# Patient Record
Sex: FEMALE | URBAN - METROPOLITAN AREA
[De-identification: names, ages, dates, MRNs, and addresses within clinical notes are randomized per-mention and may not be internally consistent; named-entity substitution may affect disease eponyms.]

---

## 2021-01-19 ENCOUNTER — APPOINTMENT (RX ONLY)
Dept: URBAN - METROPOLITAN AREA CLINIC 55 | Facility: CLINIC | Age: 66
Setting detail: DERMATOLOGY
End: 2021-01-19

## 2021-01-19 DIAGNOSIS — Z41.9 ENCOUNTER FOR PROCEDURE FOR PURPOSES OTHER THAN REMEDYING HEALTH STATE, UNSPECIFIED: ICD-10-CM

## 2021-01-19 PROCEDURE — ? PLATELET RICH PLASMA INJECTION

## 2021-01-19 PROCEDURE — ? MICRONEEDLING

## 2021-01-19 PROCEDURE — ? FILLERS

## 2021-01-19 NOTE — PROCEDURE: PLATELET RICH PLASMA INJECTION
Amount Injected At This Location In Cc (Will Not Render If 0): 0
Which Technique?: Default
Indication Override: topical
Show Additional Techniques: No
Detail Level: Simple
Consent: Written consent obtained, risks reviewed including but not limited to pain, scarring, infection and incomplete improvement.  Patient understands the procedure is cosmetic in nature and will require out of pocket payment.
Post-Care Instructions: After the procedure, take precautions agains sun exposure. Do not apply make-up for 12 hours after the procedure. Avoid alcohol based toners for 10-14 days. After 2-3 days patients can return to their regular skin regimen. [facility]inity post advance microneedling instructions given to patient
Price (Use Numbers Only, No Special Characters Or $): 300.00

## 2021-01-19 NOTE — PROCEDURE: MICRONEEDLING
Detail Level: Zone
Depth In Mm (Location #3): 0.1
Price (Use Numbers Only, No Special Characters Or $): 497
Post-Care Instructions: After the procedure, take precautions agains sun exposure. Do not apply sunscreen for 12 hours after the procedure. Do not apply make-up for 12 hours after the procedure. Avoid alcohol based toners for 10-14 days. After 2-3 days patients can return to their regular skin regimen.
Consent: Written consent obtained, risks reviewed including but not limited to pain, scarring, infection and incomplete improvement.  Patient understands the procedure is cosmetic in nature and will require out of pocket payment.
Treatment Number (Optional): 0

## 2021-01-19 NOTE — PROCEDURE: FILLERS
Additional Area 4 Volume In Cc: 0
Post-Care Instructions: Patient instructed to apply ice to reduce swelling. Post care handout given to patient.
Include Cannula Information In Note?: Yes
Price (Use Numbers Only, No Special Characters Or $): 5956.00
Include Cannula Information In Note?: No
Cheeks Filler Volume In Cc: 1
Filler: Restylane Kysse
Anesthesia Type: 1% lidocaine with epinephrine
Map Statment: See Attach Map for Complete Details
Include Cannula Size?: 27G
Lot #: [id number]
Additional Anesthesia Volume In Cc: 6
Expiration Date (Month Year): 7/31/23
Filler: Clarissa Almeida
Anesthesia Volume In Cc: 0.3
Detail Level: Detailed
Expiration Date (Month Year): 6/30/22
Lot #: [id number]
Consent: Written consent obtained. Risks include but not limited to bruising, beading, irregular texture, ulceration, infection, allergic reaction, scar formation, incomplete augmentation, temporary nature, procedural pain.

## 2021-09-23 ENCOUNTER — APPOINTMENT (RX ONLY)
Dept: URBAN - METROPOLITAN AREA CLINIC 55 | Facility: CLINIC | Age: 66
Setting detail: DERMATOLOGY
End: 2021-09-23

## 2021-09-23 ENCOUNTER — RX ONLY (OUTPATIENT)
Age: 66
Setting detail: RX ONLY
End: 2021-09-23

## 2021-09-23 DIAGNOSIS — Z41.9 ENCOUNTER FOR PROCEDURE FOR PURPOSES OTHER THAN REMEDYING HEALTH STATE, UNSPECIFIED: ICD-10-CM

## 2021-09-23 PROCEDURE — ? PRESCRIPTION

## 2021-09-23 PROCEDURE — ? COSMETIC CONSULTATION: GENERAL

## 2021-09-23 RX ORDER — ALPRAZOLAM 1 MG/1
TABLET ORAL
Qty: 1 | Refills: 0 | Status: ERX | COMMUNITY
Start: 2021-09-23

## 2021-09-23 RX ORDER — HYDROCODONE BITARTRATE AND ACETAMINOPHEN 5; 325 MG/1; MG/1
TABLET ORAL
Qty: 2 | Refills: 0 | Status: ERX | COMMUNITY
Start: 2021-09-23

## 2021-10-01 ENCOUNTER — APPOINTMENT (RX ONLY)
Dept: URBAN - METROPOLITAN AREA CLINIC 55 | Facility: CLINIC | Age: 66
Setting detail: DERMATOLOGY
End: 2021-10-01

## 2021-10-01 DIAGNOSIS — Z41.9 ENCOUNTER FOR PROCEDURE FOR PURPOSES OTHER THAN REMEDYING HEALTH STATE, UNSPECIFIED: ICD-10-CM

## 2021-10-01 PROCEDURE — ? ICON IPL

## 2021-10-01 PROCEDURE — ? SCULPTRA

## 2021-10-01 ASSESSMENT — LOCATION SIMPLE DESCRIPTION DERM
LOCATION SIMPLE: RIGHT CHEEK
LOCATION SIMPLE: LEFT CHEEK
LOCATION SIMPLE: LEFT LIP
LOCATION SIMPLE: NOSE

## 2021-10-01 ASSESSMENT — LOCATION DETAILED DESCRIPTION DERM
LOCATION DETAILED: RIGHT CENTRAL MALAR CHEEK
LOCATION DETAILED: LEFT CENTRAL MALAR CHEEK
LOCATION DETAILED: LEFT LOWER CUTANEOUS LIP
LOCATION DETAILED: NASAL DORSUM

## 2021-10-01 ASSESSMENT — LOCATION ZONE DERM
LOCATION ZONE: NOSE
LOCATION ZONE: FACE
LOCATION ZONE: LIP

## 2021-10-01 NOTE — PROCEDURE: SCULPTRA
Anesthesia Volume In Cc: 0.5
Show Temples Volume?: Yes
Forehead Sculptra Filler Volume In Cc: 0
Consent: Verbal consent obtained. Risks include but not limited to bruising, beading, irregular texture, ulceration, infection, allergic reaction, scar formation, incomplete augmentation, temporary nature, procedural pain.
Lot #: [id number]
Expiration Date (Month Year): 1/31/2024
Show Right And Left [facility] Volume?: No
Vials Reconstituted (Required For Inventory): 2
Map Statement: See Attached Map for Complete Details.
Detail Level: Simple
Dilution Method: The Sculptra was reconstituted with 8 mL of sterile water and 2 mL of 2% plain lidocaine for a total volume of 10 mL for each vial.
Injection Technique: The Sculptra was injected to the listed areas after cleansing the skin and providing appropriate anesthesia. 25 g cannula was used to deposit product.
Anesthesia Type: 1% lidocaine with epinephrine
Post-Care Instructions: Patient instructed to apply ice to reduce swelling.

## 2021-10-01 NOTE — PROCEDURE: ICON IPL
Length Of Topical Anesthesia Application (Optional): 60 minutes
Contact: Light
Energy (Optional- Include Units): 44 J/cm2 / 20 ms
Consent: Verbal consent obtained, risks reviewed including but not limited to crusting, scabbing, blistering, scarring, darker or lighter pigmentary change, bruising, and/or incomplete response.
Post-Care Instructions: I reviewed with the patient in detail post-care instructions. Patient should stay away from the sun and wear sun protection until treated areas are fully healed.
Detail Level: Zone
Energy (Optional- Include Units): 100 J/cm2 / 60 ms
Topical Anesthesia?: 20% benzocaine, 8% lidocaine, 4% tetracaine
Treatment Number (Optional): 1
Anesthesia Volume In Cc: 0
External Cooling Fan Speed: 5
Pre-Procedure Text: After consent was obtained, the treatment areas were cleansed and treated using the parameters mentioned above.
Anesthesia Type: 1% lidocaine with epinephrine

## 2022-01-27 ENCOUNTER — APPOINTMENT (RX ONLY)
Dept: URBAN - METROPOLITAN AREA CLINIC 55 | Facility: CLINIC | Age: 67
Setting detail: DERMATOLOGY
End: 2022-01-27

## 2022-01-27 DIAGNOSIS — Z41.9 ENCOUNTER FOR PROCEDURE FOR PURPOSES OTHER THAN REMEDYING HEALTH STATE, UNSPECIFIED: ICD-10-CM

## 2022-01-27 PROCEDURE — ? ICON IPL

## 2022-01-27 ASSESSMENT — LOCATION DETAILED DESCRIPTION DERM
LOCATION DETAILED: RIGHT CENTRAL MALAR CHEEK
LOCATION DETAILED: LEFT CENTRAL MALAR CHEEK

## 2022-01-27 ASSESSMENT — LOCATION ZONE DERM: LOCATION ZONE: FACE

## 2022-01-27 ASSESSMENT — LOCATION SIMPLE DESCRIPTION DERM
LOCATION SIMPLE: RIGHT CHEEK
LOCATION SIMPLE: LEFT CHEEK

## 2022-01-27 NOTE — PROCEDURE: ICON IPL
External Cooling Fan Speed: 5
Energy (Optional- Include Units): 100 J/cm2 / 60 ms
Energy (Optional- Include Units): 40 J/cm2 / 15 ms
Contact: Light
Anesthesia Volume In Cc: 0
Length Of Topical Anesthesia Application (Optional): 60 minutes
Consent: Verbal consent obtained, risks reviewed including but not limited to crusting, scabbing, blistering, scarring, darker or lighter pigmentary change, bruising, and/or incomplete response.
Post-Care Instructions: I reviewed with the patient in detail post-care instructions. Patient should stay away from the sun and wear sun protection until treated areas are fully healed.
Passes: 1
Pre-Procedure Text: After consent was obtained, the treatment areas were cleansed and treated using the parameters mentioned above.
Detail Level: Zone
Treatment Number (Optional): 2
Anesthesia Type: 1% lidocaine with epinephrine
Topical Anesthesia?: 20% benzocaine, 8% lidocaine, 4% tetracaine

## 2022-02-02 ENCOUNTER — APPOINTMENT (RX ONLY)
Dept: URBAN - METROPOLITAN AREA CLINIC 55 | Facility: CLINIC | Age: 67
Setting detail: DERMATOLOGY
End: 2022-02-02

## 2022-02-02 DIAGNOSIS — Z41.9 ENCOUNTER FOR PROCEDURE FOR PURPOSES OTHER THAN REMEDYING HEALTH STATE, UNSPECIFIED: ICD-10-CM

## 2022-02-02 PROCEDURE — ? DIAGNOSIS COMMENT

## 2022-02-02 PROCEDURE — ? COSMETIC FOLLOW-UP

## 2022-02-02 NOTE — PROCEDURE: COSMETIC FOLLOW-UP
Detail Level: Zone
Comments (Free Text): Patient had IPL full face on 1/27/22. She scheduled a phone follow up to determine if continuing with IPL treatments was optimal or if switching to Fraxel treatments would be a more effective option. At this time, patient reports some residual minor swelling to suborbital area and mild erythema throughout face. She has not been icing the area or sleeping elevated. She was advised to ice the area intermittently, sleep slightly evaluated and take an antihistamine BID to reduce swelling; verbalized understanding. Patient will start HQ 4% cream today to insure she will be ready for her Fraxel treatment if deemed the appropriate next step. The patient will call next week once the post IPL effects have dissipated to discuss POC.

## 2022-02-07 ENCOUNTER — RX ONLY (OUTPATIENT)
Age: 67
Setting detail: RX ONLY
End: 2022-02-07

## 2022-02-07 RX ORDER — HYDROQUINONE 40 MG/G
CREAM TOPICAL
Qty: 57 | Refills: 2 | COMMUNITY
Start: 2022-02-07

## 2022-09-14 ENCOUNTER — RX ONLY (OUTPATIENT)
Age: 67
Setting detail: RX ONLY
End: 2022-09-14

## 2022-09-14 ENCOUNTER — APPOINTMENT (RX ONLY)
Dept: URBAN - METROPOLITAN AREA CLINIC 55 | Facility: CLINIC | Age: 67
Setting detail: DERMATOLOGY
End: 2022-09-14

## 2022-09-14 DIAGNOSIS — Z41.9 ENCOUNTER FOR PROCEDURE FOR PURPOSES OTHER THAN REMEDYING HEALTH STATE, UNSPECIFIED: ICD-10-CM

## 2022-09-14 PROCEDURE — ? ICON IPL

## 2022-09-14 PROCEDURE — ? INVENTORY

## 2022-09-14 ASSESSMENT — LOCATION DETAILED DESCRIPTION DERM
LOCATION DETAILED: RIGHT LOWER CUTANEOUS LIP
LOCATION DETAILED: RIGHT CENTRAL MALAR CHEEK
LOCATION DETAILED: LEFT CENTRAL MALAR CHEEK

## 2022-09-14 ASSESSMENT — LOCATION SIMPLE DESCRIPTION DERM
LOCATION SIMPLE: RIGHT LIP
LOCATION SIMPLE: RIGHT CHEEK
LOCATION SIMPLE: LEFT CHEEK

## 2022-09-14 ASSESSMENT — LOCATION ZONE DERM
LOCATION ZONE: FACE
LOCATION ZONE: LIP

## 2022-09-14 NOTE — PROCEDURE: ICON IPL
Contact: Light
Pre-Procedure Text: After consent was obtained, the treatment areas were cleansed and treated using the parameters mentioned above.
Pulse Duration (Optional- Include Units): 15 ms
Detail Level: Zone
Consent obtained, risks reviewed.
Anesthesia Volume In Cc: 0
Energy (Optional- Include Units): 40 J/cm2
Treatment Number (Optional): 3
Energy (Optional- Include Units): 30 J/cm2
Pulse Duration (Optional- Include Units): 10 ms
Post-Care Instructions: I reviewed with the patient in detail post-care instructions. Patient should avoid sun exposure and to wear sun protection until treated areas are fully healed.
Passes: 1
Energy (Optional- Include Units): 36 J/cm2
External Cooling Fan Speed: 5

## 2022-10-27 ENCOUNTER — APPOINTMENT (RX ONLY)
Dept: URBAN - METROPOLITAN AREA CLINIC 55 | Facility: CLINIC | Age: 67
Setting detail: DERMATOLOGY
End: 2022-10-27

## 2022-10-27 DIAGNOSIS — Z41.9 ENCOUNTER FOR PROCEDURE FOR PURPOSES OTHER THAN REMEDYING HEALTH STATE, UNSPECIFIED: ICD-10-CM

## 2022-10-27 PROCEDURE — ? IN-HOUSE DISPENSING PHARMACY

## 2022-10-27 PROCEDURE — ? INVENTORY

## 2022-10-27 NOTE — PROCEDURE: IN-HOUSE DISPENSING PHARMACY
Product 26 Amount/Unit (Numbers Only): 0
Name Of Product 1: Anti-Aging Brightening Cream
Product 6 Unit Type: ml
Product 14 Unit Type: mg
Name Of Product 4: Hydroquinone 8% Emulsion
Product 5 Amount/Unit (Numbers Only): 30
Name Of Product 6: Rosacea Triple Combination Gel
Product 1 Application Directions: Apply nightly or as directed. Use SPF daily.
Detail Level: Zone
Name Of Product 5: Hydrating Tretinoin 0.025% Cream
Name Of Product 7: Lidocaine 23% / Tetracaine 7% Cream
Product 1 Refills: 2
Product 7 Application Directions: Apply only as directed
Product 2 Application Directions: Apply nightly or as directed.
Name Of Product 3: Acne Triple Combination Gel
Product 5 Refills: 11
Product 7 Units Dispensed: 1
Send Charges To Patient Encounter: No
Render Product Pricing In Note: Yes
Name Of Product 2: Dapsone / Spironolactone Gel

## 2022-11-28 ENCOUNTER — APPOINTMENT (RX ONLY)
Dept: URBAN - METROPOLITAN AREA CLINIC 55 | Facility: CLINIC | Age: 67
Setting detail: DERMATOLOGY
End: 2022-11-28

## 2022-11-28 DIAGNOSIS — Z41.9 ENCOUNTER FOR PROCEDURE FOR PURPOSES OTHER THAN REMEDYING HEALTH STATE, UNSPECIFIED: ICD-10-CM

## 2022-11-28 PROCEDURE — ? PRO-NOX

## 2022-11-28 PROCEDURE — ? SCULPTRA

## 2022-11-28 PROCEDURE — ? ICON IPL

## 2022-11-28 PROCEDURE — ? INVENTORY

## 2022-11-28 ASSESSMENT — LOCATION DETAILED DESCRIPTION DERM
LOCATION DETAILED: RIGHT CENTRAL MALAR CHEEK
LOCATION DETAILED: LEFT CENTRAL MALAR CHEEK

## 2022-11-28 ASSESSMENT — LOCATION ZONE DERM: LOCATION ZONE: FACE

## 2022-11-28 ASSESSMENT — LOCATION SIMPLE DESCRIPTION DERM
LOCATION SIMPLE: LEFT CHEEK
LOCATION SIMPLE: RIGHT CHEEK

## 2022-11-28 NOTE — PROCEDURE: SCULPTRA
Show Decollete Volume?: Yes
Additional Area 3 Volume In Cc: 0
Show Right And Left Cheek Volume?: No
Dilution Method: The Sculptra was reconstituted with 8cc of sterile water and 2cc 2% plain lidocaine for each vial for a total volume of 10 cc for each vial.
Anesthesia Volume In Cc: 0.5
Injection Technique: The Sculptra was injected to the areas shown with a 25g cannula after prepping the skin with alcohol and/or chlorhexidine and providing appropriate anesthesia.
Map Statement: See Attached Map for Complete Details.
Detail Level: Detailed
Consent obtained.
Additional Anesthesia Volume In Cc: 6
Show Inventory Tab: Show
Post-Care Instructions: Aftercare instructions were provided to the patient.
Vials Reconstituted (Required For Inventory): 1
Anesthesia Type: 1% lidocaine with epinephrine

## 2022-11-28 NOTE — PROCEDURE: ICON IPL
Pulse Duration (Optional- Include Units): 15 ms
Pre-Procedure Text: After consent was obtained, the treatment areas were cleansed and treated using the parameters mentioned above.
Contact: Light
Energy (Optional- Include Units): 30 J/cm2
Length Of Topical Anesthesia Application (Optional): 60 minutes
Pulse Duration (Optional- Include Units): 10 ms
Treatment Number (Optional): 4
External Cooling Fan Speed: 5
Consent obtained, risks reviewed.
Post-Care Instructions: I reviewed with the patient in detail post-care instructions. Patient should avoid sun exposure and to wear sun protection until treated areas are fully healed.
Energy (Optional- Include Units): 46 J/cm2
Passes: 1
Detail Level: Zone
Anesthesia Volume In Cc: 0
Energy (Optional- Include Units): 36 J/cm2
Topical Anesthesia?: 23% lidocaine, 7% tetracaine

## 2022-11-28 NOTE — PROCEDURE: PRO-NOX
Pre-Procedure Text: The patient was connected to the Pro-Nox machine and positioned appropriately for the anticipated procedure.  Following the procedure they were disconnected and monitored for any lasting side effects prior to leaving the office.
Post-Care Instructions: Post care reviewed. Ice pack provided.
Consent obtained, risks reviewed.
Total Time (Leave Blank If Not Wanted): 20 mins
Detail Level: Zone

## 2023-01-04 ENCOUNTER — APPOINTMENT (RX ONLY)
Dept: URBAN - METROPOLITAN AREA CLINIC 55 | Facility: CLINIC | Age: 68
Setting detail: DERMATOLOGY
End: 2023-01-04

## 2023-01-04 DIAGNOSIS — Z41.9 ENCOUNTER FOR PROCEDURE FOR PURPOSES OTHER THAN REMEDYING HEALTH STATE, UNSPECIFIED: ICD-10-CM

## 2023-01-04 PROCEDURE — ? COSMETIC FOLLOW-UP

## 2023-01-04 PROCEDURE — ? INVENTORY

## 2023-01-04 NOTE — PROCEDURE: COSMETIC FOLLOW-UP
Detail Level: Zone
Comments (Free Text): Post lower face IPL photos obtained today form treatment on 11/28/22. Before and after photos reviewed with patient. Patient is very pleased with her results at this time. Discussed POC- return in the spring for 1 vial of Sculptra and evaluate need for IPL.

## 2023-04-11 ENCOUNTER — APPOINTMENT (RX ONLY)
Dept: URBAN - METROPOLITAN AREA CLINIC 55 | Facility: CLINIC | Age: 68
Setting detail: DERMATOLOGY
End: 2023-04-11

## 2023-04-11 DIAGNOSIS — Z41.9 ENCOUNTER FOR PROCEDURE FOR PURPOSES OTHER THAN REMEDYING HEALTH STATE, UNSPECIFIED: ICD-10-CM

## 2023-04-11 PROCEDURE — ? IN-HOUSE DISPENSING PHARMACY

## 2023-04-11 PROCEDURE — ? INVENTORY

## 2023-04-11 NOTE — PROCEDURE: IN-HOUSE DISPENSING PHARMACY
Product 1 Units Dispensed: 0
Product 12 Unit Type: mg
Product 1 Application Directions: Apply nightly or as directed. Use SPF daily.
Product 7 Unit Type: ml
Product 6 Refills: 11
Product 3 Amount/Unit (Numbers Only): 30
Name Of Product 4: Hydroquinone 8% Emulsion
Product 4 Application Directions: Apply nightly or as directed.
Name Of Product 3: Acne Triple Combination Gel
Product 6 Units Dispensed: 1
Render Refills If Set To 0: Yes
Product 7 Application Directions: Apply only as directed
Detail Level: Zone
Product 1 Refills: 2
Name Of Product 6: Rosacea Triple Combination Gel
Name Of Product 2: Dapsone / Spironolactone Gel
Name Of Product 5: Hydrating Tretinoin 0.025% Cream
Name Of Product 7: Lidocaine 23% / Tetracaine 7% Cream
Name Of Product 1: Anti-Aging Brightening Cream
Send Charges To Patient Encounter: No

## 2023-05-01 ENCOUNTER — APPOINTMENT (RX ONLY)
Dept: URBAN - METROPOLITAN AREA CLINIC 55 | Facility: CLINIC | Age: 68
Setting detail: DERMATOLOGY
End: 2023-05-01

## 2023-05-01 DIAGNOSIS — Z41.9 ENCOUNTER FOR PROCEDURE FOR PURPOSES OTHER THAN REMEDYING HEALTH STATE, UNSPECIFIED: ICD-10-CM

## 2023-05-01 PROCEDURE — ? PRO-NOX

## 2023-05-01 PROCEDURE — ? SCULPTRA

## 2023-05-01 PROCEDURE — ? INVENTORY

## 2023-05-01 NOTE — PROCEDURE: PRO-NOX
Detail Level: Zone
Total Time (Leave Blank If Not Wanted): 9 mins
Consent obtained, risks reviewed.
Pre-Procedure Text: The patient was connected to the Pro-Nox machine and positioned appropriately for the anticipated procedure. Following the procedure they were disconnected and monitored for any lasting side effects prior to leaving the office.
Post-Care Instructions: Post care reviewed. Ice pack provided.

## 2023-05-01 NOTE — PROCEDURE: SCULPTRA
Jawline Sculptra Filler Volume In Cc: 0
Show Dorsal Hands Volume?: Yes
Consent obtained.
Show Right And Left Pa Volume?: No
Additional Anesthesia Volume In Cc: 6
Detail Level: Detailed
Map Statement: See Attached Map for Complete Details.
Post-Care Instructions: Aftercare instructions were provided to the patient.
Show Inventory Tab: Show
Anesthesia Type: 1% lidocaine with epinephrine
Dilution Method: The Sculptra was reconstituted with 8cc of sterile water and 2cc 2% plain lidocaine for each vial for a total volume of 10 cc for each vial.
Vials Reconstituted (Required For Inventory): 1
Anesthesia Volume In Cc: 0.5
Injection Technique: The Sculptra was injected to the areas shown with a 25g cannula after prepping the skin with alcohol and/or chlorhexidine and providing appropriate anesthesia.

## 2023-08-07 ENCOUNTER — APPOINTMENT (RX ONLY)
Dept: URBAN - METROPOLITAN AREA CLINIC 55 | Facility: CLINIC | Age: 68
Setting detail: DERMATOLOGY
End: 2023-08-07

## 2023-08-07 DIAGNOSIS — Z41.9 ENCOUNTER FOR PROCEDURE FOR PURPOSES OTHER THAN REMEDYING HEALTH STATE, UNSPECIFIED: ICD-10-CM

## 2023-08-07 PROCEDURE — ? COSMETIC CONSULTATION: GENERAL

## 2023-08-07 PROCEDURE — ? DIAGNOSIS COMMENT

## 2023-08-07 PROCEDURE — ? INVENTORY

## 2023-10-09 ENCOUNTER — APPOINTMENT (RX ONLY)
Dept: URBAN - METROPOLITAN AREA CLINIC 55 | Facility: CLINIC | Age: 68
Setting detail: DERMATOLOGY
End: 2023-10-09

## 2023-10-09 DIAGNOSIS — Z41.9 ENCOUNTER FOR PROCEDURE FOR PURPOSES OTHER THAN REMEDYING HEALTH STATE, UNSPECIFIED: ICD-10-CM

## 2023-10-09 PROCEDURE — ? PRO-NOX

## 2023-10-09 PROCEDURE — ? SCULPTRA

## 2023-10-09 PROCEDURE — ? INVENTORY

## 2023-10-09 NOTE — PROCEDURE: PRO-NOX
Detail Level: Zone
Total Time (Leave Blank If Not Wanted): 14 mins
Pre-Procedure Text: The patient was connected to the Pro-Nox machine and positioned appropriately for the anticipated procedure. Following the procedure they were disconnected and monitored for any lasting side effects prior to leaving the office.
Consent obtained, risks reviewed.
Post-Care Instructions: Post care reviewed. Ice pack provided.

## 2023-10-09 NOTE — PROCEDURE: SCULPTRA
Right Cheek Filler Volume In Cc: 0
Show Right And Left Dorsal Hands Volume?: No
Show Inventory Tab: Show
Vials Reconstituted (Required For Inventory): 1
Anesthesia Volume In Cc: 0.5
Show Local Anesthesia?: Yes
Dilution Method: The Sculptra was reconstituted with 8cc of sterile water and 2cc 2% plain lidocaine for each vial for a total volume of 10 cc for each vial.
Anesthesia Type: 1% lidocaine with epinephrine
Detail Level: Detailed
Post-Care Instructions: Aftercare instructions were provided to the patient.
Map Statement: See Attached Map for Complete Details.
Additional Anesthesia Volume In Cc: 6
Consent obtained.
Injection Technique: The Sculptra was injected to the areas shown with a 25g cannula after prepping the skin with alcohol and/or chlorhexidine and providing appropriate anesthesia.

## 2024-03-20 ENCOUNTER — APPOINTMENT (RX ONLY)
Dept: URBAN - METROPOLITAN AREA CLINIC 55 | Facility: CLINIC | Age: 69
Setting detail: DERMATOLOGY
End: 2024-03-20

## 2024-03-20 ENCOUNTER — RX ONLY (OUTPATIENT)
Age: 69
Setting detail: RX ONLY
End: 2024-03-20

## 2024-03-20 DIAGNOSIS — Z41.9 ENCOUNTER FOR PROCEDURE FOR PURPOSES OTHER THAN REMEDYING HEALTH STATE, UNSPECIFIED: ICD-10-CM

## 2024-03-20 PROCEDURE — ? INVENTORY

## 2024-03-20 PROCEDURE — ? PRO-NOX

## 2024-03-20 PROCEDURE — ? SCULPTRA

## 2024-03-20 NOTE — PROCEDURE: PRO-NOX
Pre-Procedure Text: The patient was connected to the Pro-Nox machine and positioned appropriately for the anticipated procedure. Following the procedure they were disconnected and monitored for any lasting side effects prior to leaving the office.
Post-Care Instructions: Post care reviewed. Ice pack provided.
Total Time (Leave Blank If Not Wanted): 18 mins
Detail Level: Zone
Consent obtained, risks reviewed.

## 2024-03-20 NOTE — PROCEDURE: SCULPTRA
Additional Area 4 Volume In Cc: 0
Show Map Statement?: Yes
Show Right And Left Tear Trough Volume?: No
Anesthesia Type: 1% lidocaine with epinephrine
Dilution Method: The Sculptra was reconstituted with 8cc of sterile water and 2cc 2% plain lidocaine for each vial for a total volume of 10 cc for each vial.
Map Statement: See Attached Map for Complete Details.
Injection Technique: The Sculptra was injected to the areas shown with a 25g cannula after prepping the skin with alcohol and/or chlorhexidine and providing appropriate anesthesia.
Anesthesia Volume In Cc: 0.5
Consent obtained.
Vials Reconstituted (Required For Inventory): 1
Additional Anesthesia Volume In Cc: 6
Show Inventory Tab: Show
Detail Level: Detailed
Post-Care Instructions: Aftercare instructions were provided to the patient.

## 2024-04-17 ENCOUNTER — APPOINTMENT (RX ONLY)
Dept: URBAN - METROPOLITAN AREA CLINIC 55 | Facility: CLINIC | Age: 69
Setting detail: DERMATOLOGY
End: 2024-04-17

## 2024-04-17 DIAGNOSIS — Z41.9 ENCOUNTER FOR PROCEDURE FOR PURPOSES OTHER THAN REMEDYING HEALTH STATE, UNSPECIFIED: ICD-10-CM

## 2024-04-17 PROCEDURE — ? FILLERS

## 2024-04-17 PROCEDURE — ? INVENTORY

## 2024-04-17 PROCEDURE — ? PRO-NOX

## 2024-04-17 NOTE — PROCEDURE: FILLERS
Mid Face Filler Volume In Cc: 0
Include Cannula Information In Note?: No
Post-Care Instructions: After the procedure, patient instructed to apply ice to reduce swelling.
Include Cannula Information In Note?: Yes
Filler: RHA 3
Include Cannula Size?: 25G
Include Cannula Size?: 27G
Map Statment: See Attach Map for Complete Details
Anesthesia Type: 1% lidocaine with epinephrine
Anesthesia Volume In Cc: 0.5
Additional Anesthesia Volume In Cc: 6
Detail Level: Detailed
Consent: Written consent obtained. Risks include but not limited to bruising, beading, irregular texture, ulceration, infection, allergic reaction, scar formation, incomplete augmentation, temporary nature, and procedural pain.

## 2024-04-17 NOTE — PROCEDURE: PRO-NOX
Post-Care Instructions: Post care reviewed. Ice pack provided.
Pre-Procedure Text: The patient was connected to the Pro-Nox machine and positioned appropriately for the anticipated procedure. Following the procedure they were disconnected and monitored for any lasting side effects prior to leaving the office.
Detail Level: Zone
Consent obtained, risks reviewed.
Total Time (Leave Blank If Not Wanted): 15 mins

## 2025-06-26 ENCOUNTER — RX ONLY (RX ONLY)
Age: 70
End: 2025-06-26

## 2025-06-26 ENCOUNTER — APPOINTMENT (OUTPATIENT)
Dept: URBAN - METROPOLITAN AREA CLINIC 55 | Facility: CLINIC | Age: 70
Setting detail: DERMATOLOGY
End: 2025-06-26

## 2025-06-26 DIAGNOSIS — Z41.9 ENCOUNTER FOR PROCEDURE FOR PURPOSES OTHER THAN REMEDYING HEALTH STATE, UNSPECIFIED: ICD-10-CM

## 2025-06-26 PROCEDURE — ? INVENTORY

## 2025-06-26 PROCEDURE — ? COSMETIC CONSULTATION: IPL

## 2025-06-26 PROCEDURE — ? COSMETIC CONSULTATION: LASER RESURFACING

## 2025-06-26 PROCEDURE — ? COSMETIC CONSULTATION: BOTULINUM TOXIN

## 2025-06-26 PROCEDURE — ? COSMETIC CONSULTATION: GENERAL

## 2025-06-26 PROCEDURE — ? COSMETIC CONSULTATION: FILLERS

## 2025-06-26 RX ORDER — ALPRAZOLAM 1 MG/1
TABLET ORAL
Qty: 1 | Refills: 0 | Status: ERX | COMMUNITY
Start: 2025-06-26

## 2025-06-26 RX ORDER — HYDROCODONE BITARTRATE AND ACETAMINOPHEN 5; 325 MG/1; MG/1
TABLET ORAL
Qty: 1 | Refills: 0 | Status: ERX | COMMUNITY
Start: 2025-06-26

## 2025-07-28 ENCOUNTER — APPOINTMENT (OUTPATIENT)
Dept: URBAN - METROPOLITAN AREA CLINIC 55 | Facility: CLINIC | Age: 70
Setting detail: DERMATOLOGY
End: 2025-07-28

## 2025-07-28 DIAGNOSIS — Z41.9 ENCOUNTER FOR PROCEDURE FOR PURPOSES OTHER THAN REMEDYING HEALTH STATE, UNSPECIFIED: ICD-10-CM

## 2025-07-28 PROCEDURE — ? FRAXEL

## 2025-07-28 PROCEDURE — ? BOTOX

## 2025-07-28 PROCEDURE — ? INVENTORY

## 2025-07-28 PROCEDURE — ? SCULPTRA

## 2025-07-28 NOTE — PROCEDURE: FRAXEL
Post-Care Instructions: Topical anesthetic removed with dry gauze. Skin cleansed with gentle cleanser and prepped with Hibiclens. \\nAlastin Regenerating Skin Nectar and physical sunblock applied post treatment. Ice packs sent home with patient. \\nI reviewed with the patient in detail post-care instructions. Patient should avoid sun until area fully healed.
Treatment Level: 1
Small Plastic Eye Shield Text: The ocular mucosa was anesthetized with tetracaine. Once adequate anesthesia was optained, small plastic eye shields were inserted and remained in place until the procedure was completed.
Number Of Passes: 8
Was An Eye Shield Used?: No
Location: Use Location Override
Anesthesia Type: 1% lidocaine with epinephrine
Wavelength: 1550nm
Detail Level: Zone
Small Metal Eye Shield Text: The ocular mucosa was anesthetized with tetracaine. Once adequate anesthesia was optained, small metal eye shields were inserted and remained in place until the procedure was completed.
Medium Plastic Eye Shield Text: The ocular mucosa was anesthetized with tetracaine. Once adequate anesthesia was optained, medium plastic eye shields were inserted and remained in place until the procedure was completed.
Energy(Mj/Cm2): 20
Large Plastic Eye Shield Text: The ocular mucosa was anesthetized with tetracaine. Once adequate anesthesia was optained, large plastic eye shields were inserted and remained in place until the procedure was completed.
Indication: resurfacing
Medium Metal Eye Shield Text: The ocular mucosa was anesthetized with tetracaine. Once adequate anesthesia was optained, medium metal eye shields were inserted and remained in place until the procedure was completed.
Consent obtained, risks reviewed.
Treatment Level: 4
Energy(Mj/Cm2): 70
Large Metal Eye Shield Text: The ocular mucosa was anesthetized with tetracaine. Once adequate anesthesia was optained, large metal eye shields were inserted and remained in place until the procedure was completed.
External Cooling Fan Speed: 5
Treatment Level: 7

## 2025-07-28 NOTE — PROCEDURE: SCULPTRA
Show Jawline Volume?: Yes
Mental Crease Filler Volume In Cc: 0
Show Right And Left Tear Trough Volume?: No
Vials Reconstituted (Required For Inventory): 1
Dilution Method: The Sculptra was diluted with 8 ml of sterile water and 2 ml 1% lidocaine for a total volume of 10ccs for each vial.
Anesthesia Volume In Cc: 0.5
Injection Technique: The Sculptra was injected to the listed areas after cleansing the skin and providing appropriate anesthesia.
Consent was obtained.
Map Statement: See Attached Map for Complete Details.
Detail Level: Detailed
Additional Anesthesia Volume In Cc: 6
Post-Care Instructions: Patient instructed to apply ice to reduce swelling.
Show Inventory Tab: Hide
Anesthesia Type: 1% lidocaine with epinephrine

## 2025-07-28 NOTE — PROCEDURE: BOTOX
Additional Area 3 Units: 0
Detail Level: Detailed
Show Additional Area 3: Yes
Periorbital Skin Units: 16
Consent obtained. Risks include but not limited to lid/brow ptosis, bruising, swelling, diplopia, temporary effect, incomplete chemical denervation.
Show Right And Left Brow Units: No
Incrementing Botox Units: By 0.5 Units
Post-Care Instructions: Patient instructed to not lie down for 4 hours and limit physical activity for 24 hours.
Additional Area 4 Location: Chin
Show Inventory Tab: Hide
Additional Area 3 Location: gummy smile
Additional Area 2 Location: Nasalis
Dilution (U/0.1 Cc): 4
Additional Area 1 Location: Lip

## 2025-08-08 ENCOUNTER — APPOINTMENT (OUTPATIENT)
Dept: URBAN - METROPOLITAN AREA CLINIC 55 | Facility: CLINIC | Age: 70
Setting detail: DERMATOLOGY
End: 2025-08-08

## 2025-08-08 DIAGNOSIS — Z41.9 ENCOUNTER FOR PROCEDURE FOR PURPOSES OTHER THAN REMEDYING HEALTH STATE, UNSPECIFIED: ICD-10-CM

## 2025-08-08 PROCEDURE — ? INVENTORY
